# Patient Record
Sex: FEMALE | Race: WHITE | NOT HISPANIC OR LATINO | Employment: UNEMPLOYED | URBAN - METROPOLITAN AREA
[De-identification: names, ages, dates, MRNs, and addresses within clinical notes are randomized per-mention and may not be internally consistent; named-entity substitution may affect disease eponyms.]

---

## 2023-07-21 ENCOUNTER — HOSPITAL ENCOUNTER (EMERGENCY)
Facility: HOSPITAL | Age: 38
Discharge: HOME | End: 2023-07-22
Attending: EMERGENCY MEDICINE | Admitting: EMERGENCY MEDICINE
Payer: COMMERCIAL

## 2023-07-21 ENCOUNTER — APPOINTMENT (OUTPATIENT)
Dept: GENERAL RADIOLOGY | Facility: HOSPITAL | Age: 38
End: 2023-07-21
Payer: COMMERCIAL

## 2023-07-21 DIAGNOSIS — J20.9 ACUTE BRONCHITIS, UNSPECIFIED ORGANISM: Primary | ICD-10-CM

## 2023-07-21 LAB
BASOPHILS # BLD AUTO: 0.02 10*3/MM3 (ref 0–0.2)
BASOPHILS NFR BLD AUTO: 0.6 % (ref 0–1.5)
DEPRECATED RDW RBC AUTO: 46 FL (ref 37–54)
EOSINOPHIL # BLD AUTO: 0.03 10*3/MM3 (ref 0–0.4)
EOSINOPHIL NFR BLD AUTO: 0.9 % (ref 0.3–6.2)
ERYTHROCYTE [DISTWIDTH] IN BLOOD BY AUTOMATED COUNT: 15.2 % (ref 12.3–15.4)
HCT VFR BLD AUTO: 36.7 % (ref 34–46.6)
HGB BLD-MCNC: 12 G/DL (ref 12–15.9)
IMM GRANULOCYTES # BLD AUTO: 0.01 10*3/MM3 (ref 0–0.05)
IMM GRANULOCYTES NFR BLD AUTO: 0.3 % (ref 0–0.5)
LYMPHOCYTES # BLD AUTO: 0.87 10*3/MM3 (ref 0.7–3.1)
LYMPHOCYTES NFR BLD AUTO: 25.8 % (ref 19.6–45.3)
MCH RBC QN AUTO: 27.3 PG (ref 26.6–33)
MCHC RBC AUTO-ENTMCNC: 32.7 G/DL (ref 31.5–35.7)
MCV RBC AUTO: 83.4 FL (ref 79–97)
MONOCYTES # BLD AUTO: 0.48 10*3/MM3 (ref 0.1–0.9)
MONOCYTES NFR BLD AUTO: 14.2 % (ref 5–12)
NEUTROPHILS NFR BLD AUTO: 1.96 10*3/MM3 (ref 1.7–7)
NEUTROPHILS NFR BLD AUTO: 58.2 % (ref 42.7–76)
NRBC BLD AUTO-RTO: 0 /100 WBC (ref 0–0.2)
PLATELET # BLD AUTO: 196 10*3/MM3 (ref 140–450)
PMV BLD AUTO: 10.2 FL (ref 6–12)
RBC # BLD AUTO: 4.4 10*6/MM3 (ref 3.77–5.28)
WBC NRBC COR # BLD: 3.37 10*3/MM3 (ref 3.4–10.8)
WHOLE BLOOD HOLD SPECIMEN: NORMAL

## 2023-07-21 PROCEDURE — 80053 COMPREHEN METABOLIC PANEL: CPT | Performed by: EMERGENCY MEDICINE

## 2023-07-21 PROCEDURE — 87635 SARS-COV-2 COVID-19 AMP PRB: CPT | Performed by: EMERGENCY MEDICINE

## 2023-07-21 PROCEDURE — 71046 X-RAY EXAM CHEST 2 VIEWS: CPT

## 2023-07-21 PROCEDURE — 99283 EMERGENCY DEPT VISIT LOW MDM: CPT

## 2023-07-21 PROCEDURE — 85025 COMPLETE CBC W/AUTO DIFF WBC: CPT | Performed by: EMERGENCY MEDICINE

## 2023-07-22 VITALS
SYSTOLIC BLOOD PRESSURE: 122 MMHG | DIASTOLIC BLOOD PRESSURE: 71 MMHG | WEIGHT: 141.76 LBS | HEART RATE: 76 BPM | BODY MASS INDEX: 26.09 KG/M2 | OXYGEN SATURATION: 100 % | HEIGHT: 62 IN | TEMPERATURE: 98.8 F | RESPIRATION RATE: 16 BRPM

## 2023-07-22 LAB
ALBUMIN SERPL-MCNC: 4 G/DL (ref 3.5–5.2)
ALBUMIN/GLOB SERPL: 1.4 G/DL
ALP SERPL-CCNC: 75 U/L (ref 39–117)
ALT SERPL W P-5'-P-CCNC: 11 U/L (ref 1–33)
ANION GAP SERPL CALCULATED.3IONS-SCNC: 10.5 MMOL/L (ref 5–15)
AST SERPL-CCNC: 16 U/L (ref 1–32)
BILIRUB SERPL-MCNC: <0.2 MG/DL (ref 0–1.2)
BUN SERPL-MCNC: 15 MG/DL (ref 6–20)
BUN/CREAT SERPL: 18.8 (ref 7–25)
CALCIUM SPEC-SCNC: 8.7 MG/DL (ref 8.6–10.5)
CHLORIDE SERPL-SCNC: 103 MMOL/L (ref 98–107)
CO2 SERPL-SCNC: 23.5 MMOL/L (ref 22–29)
CREAT SERPL-MCNC: 0.8 MG/DL (ref 0.57–1)
EGFRCR SERPLBLD CKD-EPI 2021: 96.3 ML/MIN/1.73
GLOBULIN UR ELPH-MCNC: 2.9 GM/DL
GLUCOSE SERPL-MCNC: 97 MG/DL (ref 65–99)
HOLD SPECIMEN: NORMAL
HOLD SPECIMEN: NORMAL
POTASSIUM SERPL-SCNC: 3.7 MMOL/L (ref 3.5–5.2)
PROT SERPL-MCNC: 6.9 G/DL (ref 6–8.5)
SARS-COV-2 RNA RESP QL NAA+PROBE: NOT DETECTED
SODIUM SERPL-SCNC: 137 MMOL/L (ref 136–145)
WHOLE BLOOD HOLD COAG: NORMAL

## 2023-07-22 RX ORDER — IBUPROFEN 400 MG/1
800 TABLET ORAL ONCE
Status: COMPLETED | OUTPATIENT
Start: 2023-07-22 | End: 2023-07-22

## 2023-07-22 RX ORDER — PREDNISONE 20 MG/1
TABLET ORAL
Qty: 15 TABLET | Refills: 0 | Status: SHIPPED | OUTPATIENT
Start: 2023-07-22

## 2023-07-22 RX ORDER — AZITHROMYCIN 250 MG/1
TABLET, FILM COATED ORAL
Qty: 6 TABLET | Refills: 0 | Status: SHIPPED | OUTPATIENT
Start: 2023-07-22

## 2023-07-22 RX ADMIN — IBUPROFEN 800 MG: 400 TABLET, FILM COATED ORAL at 00:17

## 2023-07-22 NOTE — DISCHARGE INSTRUCTIONS
Drink plenty fluids.  Take medications as directed.  Take ibuprofen every 6 hours as needed for muscle aches or fever.  Take Tylenol every 4 hours as needed for muscle extra fever.  Return for worsening symptoms.  Follow with your doctor in 2 days if no better.  You will be called if your COVID test is positive.

## 2023-07-22 NOTE — ED PROVIDER NOTES
"Time: 10:48 PM EDT  Date of encounter:  7/21/2023  Independent Historian/Clinical History and Information was obtained by:   Patient    History is limited by: N/A    Chief Complaint: Cough and chest discomfort      History of Present Illness:  Patient is a 38 y.o. year old female who presents to the emergency department for evaluation of cough and chest discomfort.  The patient states that she has had some intermittent episodes of chills and muscle aches for which she took ibuprofen and temporarily felt better and then the symptoms seem to worsen during the night.  The patient has had a cough which is nonproductive she denies any vomiting or diarrhea or fever.    HPI    Patient Care Team  Primary Care Provider: Provider, No Known    Past Medical History:     No Known Allergies  No past medical history on file.  No past surgical history on file.  No family history on file.    Home Medications:  Prior to Admission medications    Not on File        Social History:          Review of Systems:  Review of Systems   Constitutional:  Positive for chills and fatigue. Negative for fever.   HENT:  Negative for congestion, ear pain and sore throat.    Eyes:  Negative for pain.   Respiratory:  Positive for cough. Negative for chest tightness and shortness of breath.    Cardiovascular:  Negative for chest pain.   Gastrointestinal:  Negative for abdominal pain, diarrhea, nausea and vomiting.   Genitourinary:  Negative for flank pain and hematuria.   Musculoskeletal:  Positive for myalgias. Negative for joint swelling.   Skin:  Negative for pallor.   Neurological:  Negative for seizures and headaches.   All other systems reviewed and are negative.     Physical Exam:  /71 (BP Location: Right arm, Patient Position: Lying)   Pulse 76   Temp 98.8 °F (37.1 °C) (Oral)   Resp 16   Ht 157.5 cm (62\")   Wt 64.3 kg (141 lb 12.1 oz)   LMP 07/18/2023 (Approximate)   SpO2 100%   BMI 25.93 kg/m²     Physical Exam  Vitals and nursing " note reviewed.   Constitutional:       General: She is not in acute distress.     Appearance: Normal appearance. She is not toxic-appearing.   HENT:      Head: Normocephalic and atraumatic.      Right Ear: External ear normal.      Left Ear: External ear normal.      Nose: Nose normal.      Mouth/Throat:      Mouth: Mucous membranes are moist.      Pharynx: Oropharynx is clear.   Eyes:      General: No scleral icterus.     Extraocular Movements: Extraocular movements intact.      Conjunctiva/sclera: Conjunctivae normal.      Pupils: Pupils are equal, round, and reactive to light.   Cardiovascular:      Rate and Rhythm: Normal rate and regular rhythm.      Pulses: Normal pulses.      Heart sounds: Normal heart sounds.   Pulmonary:      Effort: Pulmonary effort is normal. No respiratory distress.      Breath sounds: Normal breath sounds.   Abdominal:      General: Abdomen is flat.      Palpations: Abdomen is soft.      Tenderness: There is no abdominal tenderness.   Musculoskeletal:         General: Normal range of motion.      Cervical back: Normal range of motion and neck supple. No rigidity or tenderness.   Lymphadenopathy:      Cervical: No cervical adenopathy.   Skin:     General: Skin is warm and dry.   Neurological:      Mental Status: She is alert and oriented to person, place, and time. Mental status is at baseline.   Psychiatric:         Mood and Affect: Mood normal.         Behavior: Behavior normal.         Thought Content: Thought content normal.         Judgment: Judgment normal.              Procedures:  Procedures      Medical Decision Making:      Comorbidities that affect care:    None    External Notes reviewed:    Previous Clinic Note: Office visit with Regina Gonzalez for sinusitis      The following orders were placed and all results were independently analyzed by me:  Orders Placed This Encounter   Procedures    COVID-19,CEPHEID/SONYA,COR/JASS/PAD/ROEL/MAD IN-HOUSE(OR EMERGENT/ADD-ON),NP SWAB IN  TRANSPORT MEDIA 3-4 HR TAT, RT-PCR - Swab, Nasopharynx    XR Chest 2 View    [place] Draw    Comprehensive Metabolic Panel    CBC Auto Differential    CBC & Differential    Green Top (Gel)    Lavender Top    Gold Top - SST    Light Blue Top       Medications Given in the Emergency Department:  Medications   ibuprofen (ADVIL,MOTRIN) tablet 800 mg (800 mg Oral Given 7/22/23 0017)        ED Course:    ED Course as of 07/22/23 0230   Sat Jul 22, 2023   0009 BUN: 15 [PP]      ED Course User Index  [PP] Edwardo Whitney,        Labs:    Lab Results (last 24 hours)       Procedure Component Value Units Date/Time    CBC & Differential [[id number]]  (Abnormal) Collected: 07/21/23 2330    Specimen: Blood Updated: 07/21/23 2343    Narrative:      The following orders were created for panel order CBC & Differential.  Procedure                               Abnormality         Status                     ---------                               -----------         ------                     CBC Auto Differential[[id number]]        Abnormal            Final result                 Please view results for these tests on the individual orders.    Comprehensive Metabolic Panel [[id number]] Collected: 07/21/23 2330    Specimen: Blood Updated: 07/22/23 0006     Glucose 97 mg/dL      BUN 15 mg/dL      Creatinine 0.80 mg/dL      Sodium 137 mmol/L      Potassium 3.7 mmol/L      Chloride 103 mmol/L      CO2 23.5 mmol/L      Calcium 8.7 mg/dL      Total Protein 6.9 g/dL      Albumin 4.0 g/dL      ALT (SGPT) 11 U/L      AST (SGOT) 16 U/L      Alkaline Phosphatase 75 U/L      Total Bilirubin <0.2 mg/dL      Globulin 2.9 gm/dL      A/G Ratio 1.4 g/dL      BUN/Creatinine Ratio 18.8     Anion Gap 10.5 mmol/L      eGFR 96.3 mL/min/1.73     Narrative:      GFR Normal >60  Chronic Kidney Disease <60  Kidney Failure <15      CBC Auto Differential [[id number]]  (Abnormal) Collected: 07/21/23 2330    Specimen: Blood Updated: 07/21/23 2343     WBC 3.37  10*3/mm3      RBC 4.40 10*6/mm3      Hemoglobin 12.0 g/dL      Hematocrit 36.7 %      MCV 83.4 fL      MCH 27.3 pg      MCHC 32.7 g/dL      RDW 15.2 %      RDW-SD 46.0 fl      MPV 10.2 fL      Platelets 196 10*3/mm3      Neutrophil % 58.2 %      Lymphocyte % 25.8 %      Monocyte % 14.2 %      Eosinophil % 0.9 %      Basophil % 0.6 %      Immature Grans % 0.3 %      Neutrophils, Absolute 1.96 10*3/mm3      Lymphocytes, Absolute 0.87 10*3/mm3      Monocytes, Absolute 0.48 10*3/mm3      Eosinophils, Absolute 0.03 10*3/mm3      Basophils, Absolute 0.02 10*3/mm3      Immature Grans, Absolute 0.01 10*3/mm3      nRBC 0.0 /100 WBC     COVID-19,CEPHEID/SONYA,COR/JASS/PAD/ROEL/MAD IN-HOUSE(OR EMERGENT/ADD-ON),NP SWAB IN TRANSPORT MEDIA 3-4 HR TAT, RT-PCR - Swab, Nasopharynx [[id number]]  (Normal) Collected: 07/21/23 2330    Specimen: Swab from Nasopharynx Updated: 07/22/23 0035     COVID19 Not Detected    Narrative:      Fact sheet for providers: https://www.fda.gov/media/165994/download     Fact sheet for patients: https://www.fda.gov/media/200901/download  Fact sheet for providers: https://www.fda.gov/media/530293/download     Fact sheet for patients: https://www.fda.gov/media/482159/download             Imaging:    XR Chest 2 View    Result Date: 7/22/2023  PROCEDURE: XR CHEST 2 VW  COMPARISON: 7/14/2015.  INDICATIONS: COUGH; UNSPECIFIED WEAKNESS.  FINDINGS:  Two views of the chest reveal no cardiac enlargement and no acute infiltrate.  No pleural effusion or pneumothorax is seen.  Chronic calcified granulomatous disease involves the chest.  No significant interval change is seen since the prior study.        No acute infiltrate is appreciated.    Please note that portions of this note were completed with a voice recognition program.  SYD NARVAEZ JR, MD       Electronically Signed and Approved By: SYD NARVAEZ JR, MD on 7/22/2023 at 0:00                 Differential Diagnosis and Discussion:    Cough: Differential  diagnosis includes but is not limited to pneumonia, acute bronchitis, upper respiratory infection, ACE inhibitor use, allergic reaction, epiglottitis, seasonal allergies, chemical irritants, exercise-induced asthma, viral syndrome.    All labs were reviewed and interpreted by me.    MDM     Amount and/or Complexity of Data Reviewed  Clinical lab tests: reviewed  Tests in the radiology section of CPT®: reviewed             Patient Care Considerations:    CT CHEST: I considered ordering a CT scan of the chest, however the patient has no signs of pulmonary embolism      Consultants/Shared Management Plan:    None    Social Determinants of Health:    Patient is independent, reliable, and has access to care.       Disposition and Care Coordination:    Discharged: The patient is suitable and stable for discharge with no need for consideration of observation or admission.    I have explained discharge medications and the need for follow up with the patient/caretakers. This was also printed in the discharge instructions. Patient was discharged with the following medications and follow up:      Medication List        New Prescriptions      azithromycin 250 MG tablet  Commonly known as: Zithromax Z-Nav  Take 2 tablets by mouth on day 1, then 1 tablet daily on days 2-5     predniSONE 20 MG tablet  Commonly known as: DELTASONE  Take 3 p.o. daily for 5 days.               Where to Get Your Medications        These medications were sent to [facility]/pharmacy #[id number] - [place], [place] - [address] - [phone]  - [phone]  [address] [place] [address]      Hours: 24-hours Phone: [phone]   azithromycin 250 MG tablet  predniSONE 20 MG tablet      Your primary care provider    In 2 days         Final diagnoses:   Acute bronchitis, unspecified organism        ED Disposition       ED Disposition   Discharge    Condition   Stable    Comment   --               This medical record created using voice  recognition software.             Edwardo Whitney,   07/22/23 6522

## 2024-01-12 ENCOUNTER — HOSPITAL ENCOUNTER (EMERGENCY)
Facility: HOSPITAL | Age: 39
Discharge: HOME | End: 2024-01-12
Attending: EMERGENCY MEDICINE
Payer: COMMERCIAL

## 2024-01-12 ENCOUNTER — APPOINTMENT (OUTPATIENT)
Dept: ULTRASOUND IMAGING | Facility: HOSPITAL | Age: 39
End: 2024-01-12
Payer: COMMERCIAL

## 2024-01-12 VITALS
HEART RATE: 72 BPM | WEIGHT: 150 LBS | DIASTOLIC BLOOD PRESSURE: 64 MMHG | TEMPERATURE: 98.3 F | RESPIRATION RATE: 16 BRPM | SYSTOLIC BLOOD PRESSURE: 112 MMHG | BODY MASS INDEX: 28.32 KG/M2 | OXYGEN SATURATION: 100 % | HEIGHT: 61 IN

## 2024-01-12 DIAGNOSIS — N93.9 VAGINAL BLEEDING: Primary | ICD-10-CM

## 2024-01-12 DIAGNOSIS — D25.9 UTERINE LEIOMYOMA, UNSPECIFIED LOCATION: ICD-10-CM

## 2024-01-12 LAB
ALBUMIN SERPL-MCNC: 4.1 G/DL (ref 3.5–5.2)
ALBUMIN/GLOB SERPL: 1.4 G/DL
ALP SERPL-CCNC: 69 U/L (ref 39–117)
ALT SERPL W P-5'-P-CCNC: 8 U/L (ref 1–33)
ANION GAP SERPL CALCULATED.3IONS-SCNC: 9.2 MMOL/L (ref 5–15)
AST SERPL-CCNC: 14 U/L (ref 1–32)
BASOPHILS # BLD AUTO: 0.05 10*3/MM3 (ref 0–0.2)
BASOPHILS NFR BLD AUTO: 0.6 % (ref 0–1.5)
BILIRUB SERPL-MCNC: 0.3 MG/DL (ref 0–1.2)
BUN SERPL-MCNC: 17 MG/DL (ref 6–20)
BUN/CREAT SERPL: 18.3 (ref 7–25)
CALCIUM SPEC-SCNC: 9 MG/DL (ref 8.6–10.5)
CHLORIDE SERPL-SCNC: 105 MMOL/L (ref 98–107)
CO2 SERPL-SCNC: 23.8 MMOL/L (ref 22–29)
CREAT SERPL-MCNC: 0.93 MG/DL (ref 0.57–1)
DEPRECATED RDW RBC AUTO: 45.1 FL (ref 37–54)
EGFRCR SERPLBLD CKD-EPI 2021: 80.3 ML/MIN/1.73
EOSINOPHIL # BLD AUTO: 0.04 10*3/MM3 (ref 0–0.4)
EOSINOPHIL NFR BLD AUTO: 0.5 % (ref 0.3–6.2)
ERYTHROCYTE [DISTWIDTH] IN BLOOD BY AUTOMATED COUNT: 14.9 % (ref 12.3–15.4)
GLOBULIN UR ELPH-MCNC: 2.9 GM/DL
GLUCOSE SERPL-MCNC: 108 MG/DL (ref 65–99)
HCG INTACT+B SERPL-ACNC: <0.5 MIU/ML
HCT VFR BLD AUTO: 37 % (ref 34–46.6)
HGB BLD-MCNC: 12 G/DL (ref 12–15.9)
IMM GRANULOCYTES # BLD AUTO: 0.02 10*3/MM3 (ref 0–0.05)
IMM GRANULOCYTES NFR BLD AUTO: 0.2 % (ref 0–0.5)
LYMPHOCYTES # BLD AUTO: 1.62 10*3/MM3 (ref 0.7–3.1)
LYMPHOCYTES NFR BLD AUTO: 19.4 % (ref 19.6–45.3)
MCH RBC QN AUTO: 27 PG (ref 26.6–33)
MCHC RBC AUTO-ENTMCNC: 32.4 G/DL (ref 31.5–35.7)
MCV RBC AUTO: 83.3 FL (ref 79–97)
MONOCYTES # BLD AUTO: 0.51 10*3/MM3 (ref 0.1–0.9)
MONOCYTES NFR BLD AUTO: 6.1 % (ref 5–12)
NEUTROPHILS NFR BLD AUTO: 6.1 10*3/MM3 (ref 1.7–7)
NEUTROPHILS NFR BLD AUTO: 73.2 % (ref 42.7–76)
NRBC BLD AUTO-RTO: 0 /100 WBC (ref 0–0.2)
PLATELET # BLD AUTO: 230 10*3/MM3 (ref 140–450)
PMV BLD AUTO: 9.6 FL (ref 6–12)
POTASSIUM SERPL-SCNC: 4.3 MMOL/L (ref 3.5–5.2)
PROT SERPL-MCNC: 7 G/DL (ref 6–8.5)
RBC # BLD AUTO: 4.44 10*6/MM3 (ref 3.77–5.28)
SODIUM SERPL-SCNC: 138 MMOL/L (ref 136–145)
WBC NRBC COR # BLD AUTO: 8.34 10*3/MM3 (ref 3.4–10.8)
WHOLE BLOOD HOLD COAG: NORMAL
WHOLE BLOOD HOLD SPECIMEN: NORMAL

## 2024-01-12 PROCEDURE — 99284 EMERGENCY DEPT VISIT MOD MDM: CPT

## 2024-01-12 PROCEDURE — 76830 TRANSVAGINAL US NON-OB: CPT

## 2024-01-12 PROCEDURE — 85025 COMPLETE CBC W/AUTO DIFF WBC: CPT | Performed by: EMERGENCY MEDICINE

## 2024-01-12 PROCEDURE — 80053 COMPREHEN METABOLIC PANEL: CPT

## 2024-01-12 PROCEDURE — 84702 CHORIONIC GONADOTROPIN TEST: CPT

## 2024-01-12 PROCEDURE — 36415 COLL VENOUS BLD VENIPUNCTURE: CPT

## 2024-01-12 RX ORDER — SODIUM CHLORIDE 0.9 % (FLUSH) 0.9 %
10 SYRINGE (ML) INJECTION AS NEEDED
Status: DISCONTINUED | OUTPATIENT
Start: 2024-01-12 | End: 2024-01-12 | Stop reason: HOSPADM

## 2024-01-12 NOTE — ED PROVIDER NOTES
Time: 6:40 PM EST  Date of encounter:  1/12/2024  Independent Historian/Clinical History and Information was obtained by:   Patient    History is limited by: N/A    Chief Complaint: Vaginal bleeding      History of Present Illness:  Patient is a 38 y.o. year old female who presents to the emergency department for evaluation of vaginal bleeding that began today.  Patient states she is on her menstrual cycle but today her bleeding has been more than normal.  Patient states the blood has been bright red.  Patient denies clots.  Patient denies abdominal pain.  Patient denies weakness, chest pain, shortness of air.  Patient denies fever, dysuria, flank pain.  Patient denies vaginal trauma.    HPI    Patient Care Team  Primary Care Provider: Christian Hernandez MD    Past Medical History:     No Known Allergies  No past medical history on file.  No past surgical history on file.  No family history on file.    Home Medications:  Prior to Admission medications    Medication Sig Start Date End Date Taking? Authorizing Provider   azithromycin (Zithromax Z-Nav) 250 MG tablet Take 2 tablets by mouth on day 1, then 1 tablet daily on days 2-5 7/22/23   Edwardo Whitney DO   predniSONE (DELTASONE) 20 MG tablet Take 3 p.o. daily for 5 days. 7/22/23   Edwardo Whitney DO        Social History:          Review of Systems:  Review of Systems   Constitutional:  Negative for chills and fever.   HENT:  Negative for congestion, rhinorrhea and sore throat.    Eyes:  Negative for pain and visual disturbance.   Respiratory:  Negative for apnea, cough, chest tightness and shortness of breath.    Cardiovascular:  Negative for chest pain and palpitations.   Gastrointestinal:  Negative for abdominal pain, diarrhea, nausea and vomiting.   Genitourinary:  Positive for vaginal bleeding. Negative for difficulty urinating and dysuria.   Musculoskeletal:  Negative for joint swelling and myalgias.   Skin:  Negative for color change.   Neurological:   "Negative for seizures and headaches.   Psychiatric/Behavioral: Negative.     All other systems reviewed and are negative.       Physical Exam:  /68 (BP Location: Left arm, Patient Position: Sitting)   Pulse 64   Temp 98.3 °F (36.8 °C) (Oral)   Resp 16   Ht 154.9 cm (61\")   Wt 68 kg (150 lb)   LMP 01/12/2024   SpO2 100%   BMI 28.34 kg/m²     Physical Exam  Vitals and nursing note reviewed.   Constitutional:       General: She is not in acute distress.     Appearance: Normal appearance. She is not toxic-appearing.   HENT:      Head: Normocephalic and atraumatic.      Jaw: There is normal jaw occlusion.   Eyes:      General: Lids are normal.      Extraocular Movements: Extraocular movements intact.      Conjunctiva/sclera: Conjunctivae normal.      Pupils: Pupils are equal, round, and reactive to light.   Cardiovascular:      Rate and Rhythm: Normal rate and regular rhythm.      Pulses: Normal pulses.      Heart sounds: Normal heart sounds.   Pulmonary:      Effort: Pulmonary effort is normal. No respiratory distress.      Breath sounds: Normal breath sounds. No wheezing or rhonchi.   Abdominal:      General: Abdomen is flat.      Palpations: Abdomen is soft.      Tenderness: There is no abdominal tenderness. There is no guarding or rebound.   Musculoskeletal:         General: Normal range of motion.      Cervical back: Normal range of motion and neck supple.      Right lower leg: No edema.      Left lower leg: No edema.   Skin:     General: Skin is warm and dry.   Neurological:      Mental Status: She is alert and oriented to person, place, and time. Mental status is at baseline.   Psychiatric:         Mood and Affect: Mood normal.                  Procedures:  Procedures      Medical Decision Making:      Comorbidities that affect care:    None    External Notes reviewed:          The following orders were placed and all results were independently analyzed by me:  Orders Placed This Encounter "   Procedures    US Non-ob Transvaginal    [place] Draw    CBC Auto Differential    hCG, Quantitative, Pregnancy    Comprehensive Metabolic Panel    Ambulatory Referral to Obstetrics / Gynecology    NPO Diet NPO Type: Strict NPO    Undress & Gown    Vital Signs    Orthostatic Blood Pressure    Supplies To Bedside - Notify MD When Ready- Pelvic cart / set up    Pulse Oximetry    Oxygen Therapy- Nasal Cannula; Titrate 1-6 LPM Per SpO2; 90 - 95%    Type & Screen    Insert Peripheral IV    CBC & Differential    Green Top (Gel)    Lavender Top    Gold Top - SST    Light Blue Top       Medications Given in the Emergency Department:  Medications   sodium chloride 0.9 % flush 10 mL (has no administration in time range)        ED Course:         Labs:    Lab Results (last 24 hours)       Procedure Component Value Units Date/Time    CBC & Differential [[id number]]  (Abnormal) Collected: 01/12/24 1557    Specimen: Blood Updated: 01/12/24 1607    Narrative:      The following orders were created for panel order CBC & Differential.  Procedure                               Abnormality         Status                     ---------                               -----------         ------                     CBC Auto Differential[[id number]]        Abnormal            Final result                 Please view results for these tests on the individual orders.    CBC Auto Differential [[id number]]  (Abnormal) Collected: 01/12/24 1557    Specimen: Blood Updated: 01/12/24 1607     WBC 8.34 10*3/mm3      RBC 4.44 10*6/mm3      Hemoglobin 12.0 g/dL      Hematocrit 37.0 %      MCV 83.3 fL      MCH 27.0 pg      MCHC 32.4 g/dL      RDW 14.9 %      RDW-SD 45.1 fl      MPV 9.6 fL      Platelets 230 10*3/mm3      Neutrophil % 73.2 %      Lymphocyte % 19.4 %      Monocyte % 6.1 %      Eosinophil % 0.5 %      Basophil % 0.6 %      Immature Grans % 0.2 %      Neutrophils, Absolute 6.10 10*3/mm3      Lymphocytes, Absolute 1.62 10*3/mm3      Monocytes,  Absolute 0.51 10*3/mm3      Eosinophils, Absolute 0.04 10*3/mm3      Basophils, Absolute 0.05 10*3/mm3      Immature Grans, Absolute 0.02 10*3/mm3      nRBC 0.0 /100 WBC     hCG, Quantitative, Pregnancy [[id number]] Collected: 01/12/24 1557    Specimen: Blood Updated: 01/12/24 1648     HCG Quantitative <0.50 mIU/mL     Narrative:      HCG Ranges by Gestational Age    Females - non-pregnant premenopausal   </= 1mIU/mL HCG  Females - postmenopausal               </= 7mIU/mL HCG    3 Weeks       5.4   -      72 mIU/mL  4 Weeks      10.2   -     708 mIU/mL  5 Weeks       217   -   8,245 mIU/mL  6 Weeks       152   -  32,177 mIU/mL  7 Weeks     4,059   - 153,767 mIU/mL  8 Weeks    31,366   - 149,094 mIU/mL  9 Weeks    59,109   - 135,901 mIU/mL  10 Weeks   44,186   - 170,409 mIU/mL  12 Weeks   27,107   - 201,615 mIU/mL  14 Weeks   24,302   -  93,646 mIU/mL  15 Weeks   12,540   -  69,747 mIU/mL  16 Weeks    8,904   -  55,332 mIU/mL  17 Weeks    8,240   -  51,793 mIU/mL  18 Weeks    9,649   -  55,271 mIU/mL      Comprehensive Metabolic Panel [[id number]]  (Abnormal) Collected: 01/12/24 1557    Specimen: Blood Updated: 01/12/24 1625     Glucose 108 mg/dL      BUN 17 mg/dL      Creatinine 0.93 mg/dL      Sodium 138 mmol/L      Potassium 4.3 mmol/L      Chloride 105 mmol/L      CO2 23.8 mmol/L      Calcium 9.0 mg/dL      Total Protein 7.0 g/dL      Albumin 4.1 g/dL      ALT (SGPT) 8 U/L      AST (SGOT) 14 U/L      Alkaline Phosphatase 69 U/L      Total Bilirubin 0.3 mg/dL      Globulin 2.9 gm/dL      A/G Ratio 1.4 g/dL      BUN/Creatinine Ratio 18.3     Anion Gap 9.2 mmol/L      eGFR 80.3 mL/min/1.73     Narrative:      GFR Normal >60  Chronic Kidney Disease <60  Kidney Failure <15               Imaging:    US Non-ob Transvaginal    Result Date: 1/12/2024  PROCEDURE: US NON-OB TRANSVAGINAL  COMPARISON: None  INDICATIONS: heavy vaginal bleeding  TECHNIQUE: Ultrasound examination of the pelvis was performed, using endovaginal  technique.   FINDINGS:  UTERUS: Size is 8.4 x 4.3 x 5.4 cm with unremarkable appearance.  There is a round heterogeneous part shadowing lesion within the uterine fundus closely approximating endometrial stripe measuring 1.9 x 2.1 x 1.7 cm.  Myometrium otherwise homogeneous without additional lesion.  Endometrial stripe more inferiorly does not appear thickened measuring up to 4 mm within normal limits for patient age. ADNEXAE: Normal bilateral appearance with no significant masses.  Each ovary is normal in size for a patient of this age.  Symmetric color and spectral flow. CUL-DE-SAC: Normal.  No fluid or mass.  OTHER: Negative.         1.  Solid lesion in the uterine fundus closely approximating endometrial stripe measuring up to 2.1 cm.  This likely represents a uterine fibroid with submucosal contact in the setting of heavy bleeding.  Consider gynecology consultation.  A contrasted MRI of the pelvis could be considered for more accurate assessment and prior to any procedural /surgical intervention. 2. Normal appearing ovaries.     WAYNE MCMAHAN MD       Electronically Signed and Approved By: WAYNE MCMAHAN MD on 1/12/2024 at 17:50                Differential Diagnosis and Discussion:    Vaginal Bleeding: Differential diagnosis includes but is not limited to foreign body, tumor, vaginitis, dysfunctional uterine bleeding, endocrine abnormalities, coagulation disorder, systemic illness, polyps, complications of pregnancy (possible ectopic pregnancy).    All labs were reviewed and interpreted by me.  Ultrasound impression was interpreted by me.     MDM     The patient´s CBC that was reviewed and interpreted by me shows no abnormalities of critical concern. Of note, there is no anemia requiring a blood transfusion and the platelet count is acceptable.  The patient´s CMP that was reviewed and interpretted by me shows no abnormalities of critical concern. Of note, the patient´s sodium and potassium are acceptable.  The patient´s liver enzymes are unremarkable. The patient´s renal function (creatinine) is preserved. The patient has a normal anion gap.  hCG negative.  Ultrasound non-OB transvaginal shows Solid lesion in the uterine fundus closely approximating endometrial stripe measuring up to 2.1 cm.  This likely represents a uterine fibroid with submucosal contact in the setting of heavy bleeding.  Patient reports she is not actively bleeding at this time.  Patient states she has no complaints.  Given the patient's ultrasound reading I will help provide an ambulatory referral to OB/GYN for follow-up.  I instructed patient to return to the ED if she develops any worsening symptoms.  Patient states she understands and agrees with plan of care.          Patient Care Considerations:          Consultants/Shared Management Plan:    None    Social Determinants of Health:    Patient is independent, reliable, and has access to care.       Disposition and Care Coordination:    Discharged: I considered escalation of care by admitting this patient to the hospital, however the patient has improved and is suitable and stable for discharge.    I have explained the patient´s condition, diagnoses and treatment plan based on the information available to me at this time. I have answered questions and addressed any concerns. The patient has a good  understanding of the patient´s diagnosis, condition, and treatment plan as can be expected at this point. The vital signs have been stable. The patient´s condition is stable and appropriate for discharge from the emergency department.      The patient will pursue further outpatient evaluation with the primary care physician or other designated or consulting physician as outlined in the discharge instructions. They are agreeable to this plan of care and follow-up instructions have been explained in detail. The patient has received these instructions in written format and have expressed an understanding  of the discharge instructions. The patient is aware that any significant change in condition or worsening of symptoms should prompt an immediate return to this or the closest emergency department or call to 1.  I have explained discharge medications and the need for follow up with the patient/caretakers. This was also printed in the discharge instructions. Patient was discharged with the following medications and follow up:      Medication List      No changes were made to your prescriptions during this visit.      Naif Arnold MD  [address] [place], [place] [id number]  [phone]    Schedule an appointment as soon as possible for a visit in 3 days  Further evaluation/follow-up       Final diagnoses:   Vaginal bleeding   Uterine leiomyoma, unspecified location        ED Disposition       ED Disposition   Discharge    Condition   Stable    Comment   --               This medical record created using voice recognition software.             Gordon Gonsalez PA-C  01/12/24 2624

## 2024-01-12 NOTE — ED NOTES
Pt discharged home, all questions answered. She ambulated out of ED by herself all questions answered.

## 2024-01-31 ENCOUNTER — TELEPHONE (OUTPATIENT)
Dept: OBSTETRICS AND GYNECOLOGY | Facility: CLINIC | Age: 39
End: 2024-01-31
Payer: COMMERCIAL

## 2024-01-31 NOTE — TELEPHONE ENCOUNTER
Provider: NONE YET    Caller: BO SKINNER    Relationship to Patient: ER    Pharmacy:     Phone Number: [phone] (PATIENT) VERIFIED    Reason for Call: LUIZ/BRODY ADV WOULD LIKE OFFICE TO SCHEDULE NEW GYN ABNORMAL BLEEDING DURING MENSTRUAL CYCLE - U/S POSS LESOIN/FIBROID IN CHART    When was the patient last seen:

## 2024-02-07 PROBLEM — D25.0 SUBMUCOUS LEIOMYOMA OF UTERUS: Status: ACTIVE | Noted: 2024-02-07

## 2024-02-07 PROBLEM — N93.9 ABNORMAL UTERINE BLEEDING (AUB): Status: ACTIVE | Noted: 2024-02-07

## 2024-02-07 NOTE — PROGRESS NOTES
"GYN new patient    CC: AUB    Tobacco/Nicotine use:  No    HPI:   38 y.o. Contraception or HRT: Contraception:  Tubal ligation  Menses:   q 22 days, lasts 4 days, changes products q 3 hrs on heaviest days.   Pain:  None    Patient is complaining of increasingly heavy Cycles for the past several months.  US Non-ob Transvaginal (2024 17:25)    History: PMHx, Meds, Allergies, PSHx, Social Hx, and POBHx all reviewed and updated.  PCP:Christian Hernandez MD      Review of Systems     /70   Pulse 73   Ht 154.9 cm (61\")   Wt 69.9 kg (154 lb)   LMP 2024   Breastfeeding No   BMI 29.10 kg/m²     Physical Exam  Vitals and nursing note reviewed. Exam conducted with a chaperone present.   Constitutional:       Appearance: Normal appearance.   Cardiovascular:      Rate and Rhythm: Normal rate and regular rhythm.      Heart sounds: Normal heart sounds.   Pulmonary:      Effort: Pulmonary effort is normal.      Breath sounds: Normal breath sounds.   Abdominal:      General: Abdomen is flat. Bowel sounds are normal.      Palpations: Abdomen is soft.   Genitourinary:     General: Normal vulva.      Exam position: Lithotomy position.      Labia:         Right: No lesion.         Left: No lesion.       Urethra: No prolapse or urethral lesion.      Vagina: Normal. Bleeding present.      Cervix: Normal.      Uterus: Normal.       Adnexa: Right adnexa normal and left adnexa normal.   Neurological:      Mental Status: She is alert.     I reviewed the patient's emergency department visit notes and I also reviewed the patient's pelvic ultrasound    ASSESSMENT AND PLAN:  Problem Visit    Diagnoses and all orders for this visit:    1. Abnormal uterine bleeding (AUB) (Primary)  Assessment & Plan:  Pt states she has been having increasingly heavy cycles  States periods used to cause her to feel sick and lightheaded as a teenager and started OCPs.  Conceived on OCPs.  Lost 50 pounds several years ago and has regained about " 30 pounds  States menses will last 4 days of regular flow and then will have some BTB about a week later for 4-5 days.  Has had episodes of bleeding so heavy, she bleeds through protection and blood runs down her legs  Sometimes feels weak and lightheaded on menses.  Passes large clots as well  Patient has never tried medical management for the symptoms.  She also states she will occasionally have some hot flashes and night sweats that are sporadic.  We discussed the possibility of perimenopause.  Patient was counseled regarding the risks benefits of OCPs.  She is a non-smoker with normal blood pressure no history of VTE.  Patient will start OCPs with this cycle and follow-up in 3 months for reevaluation as well as an endometrial biopsy    Orders:  -     norgestrel-ethinyl estradiol (LOW-OGESTREL,CRYSELLE) 0.3-30 MG-MCG per tablet; Take 1 tablet by mouth Daily.  Dispense: 84 tablet; Refill: 3  -     IgP, Aptima HPV    2. Submucous leiomyoma of uterus  Assessment & Plan:  Ultrasound while patient was in the ER showed a 2 cm fundal fibroid that appears to be submucosal.    Orders:  -     norgestrel-ethinyl estradiol (LOW-OGESTREL,CRYSELLE) 0.3-30 MG-MCG per tablet; Take 1 tablet by mouth Daily.  Dispense: 84 tablet; Refill: 3    3. Pap smear for cervical cancer screening  -     IgP, Aptima HPV        Counseling:     OCP/Hormone use risk SUMMER  Track menses, RTO IF <q21d, >7d long, or heavy              Follow Up:  Return in about 3 months (around 5/8/2024) for EMB.        Yanelis Ceja MD  02/08/2024

## 2024-02-08 ENCOUNTER — OFFICE VISIT (OUTPATIENT)
Dept: OBSTETRICS AND GYNECOLOGY | Facility: CLINIC | Age: 39
End: 2024-02-08
Payer: COMMERCIAL

## 2024-02-08 VITALS
BODY MASS INDEX: 29.07 KG/M2 | DIASTOLIC BLOOD PRESSURE: 70 MMHG | HEART RATE: 73 BPM | HEIGHT: 61 IN | WEIGHT: 154 LBS | SYSTOLIC BLOOD PRESSURE: 108 MMHG

## 2024-02-08 DIAGNOSIS — N93.9 ABNORMAL UTERINE BLEEDING (AUB): Primary | ICD-10-CM

## 2024-02-08 DIAGNOSIS — Z12.4 PAP SMEAR FOR CERVICAL CANCER SCREENING: ICD-10-CM

## 2024-02-08 DIAGNOSIS — D25.0 SUBMUCOUS LEIOMYOMA OF UTERUS: ICD-10-CM

## 2024-02-08 PROCEDURE — 87624 HPV HI-RISK TYP POOLED RSLT: CPT

## 2024-02-08 PROCEDURE — G0123 SCREEN CERV/VAG THIN LAYER: HCPCS

## 2024-02-08 NOTE — ASSESSMENT & PLAN NOTE
Ultrasound while patient was in the ER showed a 2 cm fundal fibroid that appears to be submucosal.

## 2024-02-08 NOTE — ASSESSMENT & PLAN NOTE
Pt states she has been having increasingly heavy cycles  States periods used to cause her to feel sick and lightheaded as a teenager and started OCPs.  Conceived on OCPs.  Lost 50 pounds several years ago and has regained about 30 pounds  States menses will last 4 days of regular flow and then will have some BTB about a week later for 4-5 days.  Has had episodes of bleeding so heavy, she bleeds through protection and blood runs down her legs  Sometimes feels weak and lightheaded on menses.  Passes large clots as well  Patient has never tried medical management for the symptoms.  She also states she will occasionally have some hot flashes and night sweats that are sporadic.  We discussed the possibility of perimenopause.  Patient was counseled regarding the risks benefits of OCPs.  She is a non-smoker with normal blood pressure no history of VTE.  Patient will start OCPs with this cycle and follow-up in 3 months for reevaluation as well as an endometrial biopsy

## 2024-02-09 ENCOUNTER — PATIENT ROUNDING (BHMG ONLY) (OUTPATIENT)
Dept: OBSTETRICS AND GYNECOLOGY | Facility: CLINIC | Age: 39
End: 2024-02-09
Payer: COMMERCIAL

## 2024-02-09 NOTE — PROGRESS NOTES
A My-Chart message has been sent to the patient for PATIENT ROUNDING with [facility].

## 2024-02-13 LAB
CYTOLOGIST CVX/VAG CYTO: ABNORMAL
CYTOLOGY CVX/VAG DOC CYTO: ABNORMAL
CYTOLOGY CVX/VAG DOC THIN PREP: ABNORMAL
DX ICD CODE: ABNORMAL
DX ICD CODE: ABNORMAL
HIV 1 & 2 AB SER-IMP: ABNORMAL
HPV I/H RISK 4 DNA CVX QL PROBE+SIG AMP: NEGATIVE
OTHER STN SPEC: ABNORMAL
PATHOLOGIST CVX/VAG CYTO: ABNORMAL
STAT OF ADQ CVX/VAG CYTO-IMP: ABNORMAL

## 2024-05-16 NOTE — PROGRESS NOTES
"    CC: Presents for Endometrial biopsy  Procedure was explained in detail.  She understands the potential risks include, but are not limited to, failure (pregnancy), pain, bleeding, uterine perforation, and infection.  Her questions have been answered.      Subjective:  AUB.  Is having side effects on OCPs and would like to discuss other management    Objective:  /67   Pulse 83   Ht 154.9 cm (61\")   Wt 64.4 kg (142 lb)   LMP 05/05/2024 (Approximate)   BMI 26.83 kg/m²   General- NAD, alert and oriented, appropriate  Psych- Normal mood, good memory  Abdomen- Soft, non distended, non tender, no masses  External genitalia- Normal, no lesions  Vagina- Normal, no discharge  Uterus- Normal size, shape & consistency.  Non tender, mobile.  Cvx- Normal without lesion or discharge.     Betadine x3.  Tenaculum placed Yes.   Uterus sounded to 8.5cm.    Patient tolerated well.      Assessment and Plan:  Diagnoses and all orders for this visit:    1. Abnormal uterine bleeding (AUB) (Primary)  Assessment & Plan:  Has been on OCPs for 3 months  First cycle was short and light.  The last 2 cycles she had very heavy bleeding.  Changes products q 1 hours and having accidents  States she has also developed severe leg cramps since starting OCPs as well as acne on her chin.    Orders:  -     Tissue Pathology Exam  -     POC Pregnancy, Urine          Counseling:  She was counseled on EMB.  PRECAUTIONS-- If she has any fevers >101.5F, and abdominal/pelvic pain, or bleeding > 1pad/2hours, she needs to call our office or on call/ER (after hours/weekend).  Follow Up:  Return for schedule Neida.      Yanelis Ceja MD  05/22/2024  "

## 2024-05-22 ENCOUNTER — PROCEDURE VISIT (OUTPATIENT)
Dept: OBSTETRICS AND GYNECOLOGY | Facility: CLINIC | Age: 39
End: 2024-05-22
Payer: COMMERCIAL

## 2024-05-22 VITALS
HEIGHT: 61 IN | HEART RATE: 83 BPM | DIASTOLIC BLOOD PRESSURE: 67 MMHG | SYSTOLIC BLOOD PRESSURE: 100 MMHG | BODY MASS INDEX: 26.81 KG/M2 | WEIGHT: 142 LBS

## 2024-05-22 DIAGNOSIS — N93.9 ABNORMAL UTERINE BLEEDING (AUB): Primary | ICD-10-CM

## 2024-05-22 LAB
B-HCG UR QL: NEGATIVE
EXPIRATION DATE: NORMAL
INTERNAL NEGATIVE CONTROL: NORMAL
INTERNAL POSITIVE CONTROL: NORMAL
Lab: NORMAL

## 2024-05-22 PROCEDURE — 88305 TISSUE EXAM BY PATHOLOGIST: CPT | Performed by: OBSTETRICS & GYNECOLOGY

## 2024-05-22 PROCEDURE — 81025 URINE PREGNANCY TEST: CPT | Performed by: OBSTETRICS & GYNECOLOGY

## 2024-05-22 PROCEDURE — 58100 BIOPSY OF UTERUS LINING: CPT | Performed by: OBSTETRICS & GYNECOLOGY

## 2024-05-22 NOTE — ASSESSMENT & PLAN NOTE
Has been on OCPs for 3 months  First cycle was short and light.  The last 2 cycles she had very heavy bleeding.  Changes products q 1 hours and having accidents  States she has also developed severe leg cramps since starting OCPs as well as acne on her chin.

## 2024-05-24 LAB
CYTO UR: NORMAL
LAB AP CASE REPORT: NORMAL
LAB AP CLINICAL INFORMATION: NORMAL
PATH REPORT.FINAL DX SPEC: NORMAL
PATH REPORT.GROSS SPEC: NORMAL

## 2024-06-06 ENCOUNTER — TELEPHONE (OUTPATIENT)
Dept: OBSTETRICS AND GYNECOLOGY | Facility: CLINIC | Age: 39
End: 2024-06-06
Payer: COMMERCIAL

## 2024-06-06 NOTE — TELEPHONE ENCOUNTER
Left a message trying to let the patient she needs to schedule an appointment to discuss. HUB TO RELAY

## 2024-06-06 NOTE — TELEPHONE ENCOUNTER
Provider: DR SIMMS    Caller: RENATA SPENCER    Relationship to Patient: SELF      Phone Number: [phone]    Reason for Call: PT STATED SHE HAS BEEN THINKING ABOUT GETTING THE MIRENA INSERTED BUT NOW SHE WANTS TO DO THE ABLATION INSTEAD; PT WOULD LIKE TO SPEAK TO DR SIMMS ABOUT THIS    PLEASE CALL PT TO ADVISE    PT IS SCHEDULED FOR MIRENA AND LAB FOR THE MIRENA

## 2024-07-23 NOTE — PROGRESS NOTES
"GYN Visit    CC: AUB    HPI:   39 y.o. Contraception or HRT: Contraception:  Tubal ligation  Pt continues to have AUB since stopping OCPs.  She desires definitive therapy.  Most recent Hgb was 10.2 and she is currently on iron  Patient has failed medical management and desires definitive surgical therapy      History: PMHx, Meds, Allergies, PSHx, Social Hx, and POBHx all reviewed and updated.  Physical Exam   PHYSICAL EXAM:  /72   Pulse 76   Ht 154.9 cm (60.98\")   Wt 64.9 kg (143 lb)   LMP 2024 (Exact Date)   Breastfeeding No   BMI 27.03 kg/m²   General- NAD, alert and oriented, appropriate  Psych- Normal mood, good memory    Neck- No masses, no thyroid enlargement  CV- Regular rhythm, no murnurs  Resp- CTA to bases, no wheezes  Abdomen- Soft, non distended, non tender, no masses      ASSESSMENT AND PLAN:  Diagnoses and all orders for this visit:    1. Abnormal uterine bleeding (AUB) (Primary)  Assessment & Plan:  Pt stopped the OCPs as they weren't helping and she was having side effects  She is interested in surgical management    Orders:  -     Case Request; Standing  -     Cancel: sodium chloride 0.9 % flush 3 mL  -     Cancel: sodium chloride 0.9 % flush 10 mL  -     Cancel: sodium chloride 0.9 % infusion 40 mL  -     Cancel: lactated ringers infusion  -     Cancel: acetaminophen (TYLENOL) tablet 1,000 mg  -     Cancel: ceFAZolin (ANCEF) 2,000 mg in sodium chloride 0.9 % 100 mL IVPB  -     Case Request    Other orders  -     Cancel: Code Status and Medical Interventions: CPR (Attempt to Resuscitate); Full Support; Standing  -     Cancel: Follow Anesthesia Guidelines / Protocol; Future  -     Cancel: Follow Anesthesia Guidelines / Protocol; Standing  -     Cancel: Obtain Informed Consent; Standing  -     Cancel: Verify / Perform Chlorhexidine Skin Prep; Standing  -     Cancel: CBC & Differential; Standing  -     Cancel: Urinalysis With Culture If Indicated -; Standing  -     Cancel: " Pregnancy, Urine - Urine, Clean Catch; Standing  -     Cancel: Insert Peripheral IV; Standing  -     Cancel: Saline Lock & Maintain IV Access; Standing  -     Cancel: Place Sequential Compression Device; Standing  -     Cancel: Maintain Sequential Compression Device; Standing        Counseling: Pt was counseled at length regarding the risks and benefits of surgery.  The risks include but are not limited to the risk of anesthesia, the risk of bleeding, the risk of infection, the risk of injury to the bowel, bladder, ureters and any surrounding structures.  Risks also include possibility of needing future surgery to correct an issue as a result of the first surgery.  All questions were answered and informed consent was obtained.         Follow Up:  Return for 2 weeks preop.          Yanelis Ceja MD  07/24/2024

## 2024-07-24 ENCOUNTER — OFFICE VISIT (OUTPATIENT)
Dept: OBSTETRICS AND GYNECOLOGY | Facility: CLINIC | Age: 39
End: 2024-07-24
Payer: COMMERCIAL

## 2024-07-24 VITALS
WEIGHT: 143 LBS | HEART RATE: 76 BPM | BODY MASS INDEX: 27 KG/M2 | DIASTOLIC BLOOD PRESSURE: 72 MMHG | HEIGHT: 61 IN | SYSTOLIC BLOOD PRESSURE: 108 MMHG

## 2024-07-24 DIAGNOSIS — N93.9 ABNORMAL UTERINE BLEEDING (AUB): Primary | ICD-10-CM

## 2024-07-24 RX ORDER — SODIUM CHLORIDE 0.9 % (FLUSH) 0.9 %
3 SYRINGE (ML) INJECTION EVERY 12 HOURS SCHEDULED
Status: CANCELLED | OUTPATIENT
Start: 2024-07-24

## 2024-07-24 RX ORDER — SODIUM CHLORIDE, SODIUM LACTATE, POTASSIUM CHLORIDE, CALCIUM CHLORIDE 600; 310; 30; 20 MG/100ML; MG/100ML; MG/100ML; MG/100ML
125 INJECTION, SOLUTION INTRAVENOUS CONTINUOUS
Status: CANCELLED | OUTPATIENT
Start: 2024-07-24

## 2024-07-24 RX ORDER — SODIUM CHLORIDE 0.9 % (FLUSH) 0.9 %
10 SYRINGE (ML) INJECTION AS NEEDED
Status: CANCELLED | OUTPATIENT
Start: 2024-07-24

## 2024-07-24 RX ORDER — ASCORBIC ACID 500 MG
1 TABLET ORAL DAILY
COMMUNITY
Start: 2024-05-29

## 2024-07-24 RX ORDER — SODIUM CHLORIDE 9 MG/ML
40 INJECTION, SOLUTION INTRAVENOUS AS NEEDED
Status: CANCELLED | OUTPATIENT
Start: 2024-07-24

## 2024-07-24 RX ORDER — ACETAMINOPHEN 500 MG
1000 TABLET ORAL ONCE
Status: CANCELLED | OUTPATIENT
Start: 2024-07-24 | End: 2024-07-24

## 2024-07-24 RX ORDER — FERROUS SULFATE 325(65) MG
1 TABLET, DELAYED RELEASE (ENTERIC COATED) ORAL DAILY
COMMUNITY
Start: 2024-05-28

## 2024-07-24 NOTE — ASSESSMENT & PLAN NOTE
Pt stopped the OCPs as they weren't helping and she was having side effects  She is interested in surgical management

## 2024-08-12 NOTE — PROGRESS NOTES
CC: Presents for Endometrial biopsy  Procedure was explained in detail.  She understands the potential risks include, but are not limited to, failure (pregnancy), pain, bleeding, uterine perforation, and infection.  Her questions have been answered.      Subjective:  Pt has no complaints    Objective:  /71   Pulse 70   Wt 65.8 kg (145 lb)   LMP 08/07/2024 (Exact Date)   Breastfeeding No   BMI 27.41 kg/m²   General- NAD, alert and oriented, appropriate  Psych- Normal mood, good memory  Abdomen- Soft, non distended, non tender, no masses  Paracervical block performed after patient consent obtained with 1% lidocaine approximately 5 mL bilaterally  External genitalia- Normal, no lesions  Vagina- Normal, no discharge  Uterus- Normal size, shape & consistency.  Non tender, mobile.  Cvx- Normal without lesion or discharge.     Betadine x3.  Tenaculum placed Yes.   Uterus sounded to 8.5cm.    Patient tolerated well.      Assessment and Plan:  Diagnoses and all orders for this visit:    1. Encounter for preprocedural laboratory examination (Primary)  -     POC Pregnancy, Urine    2. Abnormal uterine bleeding (AUB)  Assessment & Plan:  Pt is scheduled for hysteroscopy/D&C/endometrial ablation.  She has failed medical management of AUB.  She is here today for EMB.  EMB was obtained without difficulty    Orders:  -     Tissue Pathology Exam    3. Submucous leiomyoma of uterus  Assessment & Plan:  Scheduled for endometrial ablation            Counseling:  She was counseled on EMB.  PRECAUTIONS-- If she has any fevers >101.5F, and abdominal/pelvic pain, or bleeding > 1pad/2hours, she needs to call our office or on call/ER (after hours/weekend).  Follow Up:  Return for 2 weeks post op.      Yanelis Ceja MD  08/13/2024

## 2024-08-13 ENCOUNTER — PROCEDURE VISIT (OUTPATIENT)
Dept: OBSTETRICS AND GYNECOLOGY | Facility: CLINIC | Age: 39
End: 2024-08-13
Payer: COMMERCIAL

## 2024-08-13 VITALS
SYSTOLIC BLOOD PRESSURE: 102 MMHG | DIASTOLIC BLOOD PRESSURE: 71 MMHG | BODY MASS INDEX: 27.41 KG/M2 | HEART RATE: 70 BPM | WEIGHT: 145 LBS

## 2024-08-13 DIAGNOSIS — N93.9 ABNORMAL UTERINE BLEEDING (AUB): ICD-10-CM

## 2024-08-13 DIAGNOSIS — D25.0 SUBMUCOUS LEIOMYOMA OF UTERUS: ICD-10-CM

## 2024-08-13 DIAGNOSIS — Z01.812 ENCOUNTER FOR PREPROCEDURAL LABORATORY EXAMINATION: Primary | ICD-10-CM

## 2024-08-13 PROCEDURE — 88305 TISSUE EXAM BY PATHOLOGIST: CPT | Performed by: OBSTETRICS & GYNECOLOGY

## 2024-08-14 NOTE — ASSESSMENT & PLAN NOTE
Pt is scheduled for hysteroscopy/D&C/endometrial ablation.  She has failed medical management of AUB.  She is here today for EMB.  EMB was obtained without difficulty

## 2024-09-03 NOTE — PRE-PROCEDURE INSTRUCTIONS
PATIENT INSTRUCTED TO BE:    - NOTHING TO EAT AFTER MIDNIGHT OR CHEW, EXCEPT CAN HAVE CLEAR LIQUIDS 2 HOURS PRIOR TO SURGERY ARRIVAL TIME , NO MORE THAN 8 OZ. (NOTHING RED)     - TO HOLD ALL VITAMINS, SUPPLEMENTS, NSAIDS FOR ONE WEEK PRIOR TO THEIR SURGICAL PROCEDURE ( STOP VITAMIN C)    - DO NOT TAKE ___N/A__ 7 DAYS PRIOR TO PROCEDURE PER ANESTHESIA RECOMMENDATIONS/INSTRUCTIONS     - INSTRUCTED PT TO USE SURGICAL SOAP 1 TIME THE NIGHT PRIOR TO SURGERY __5-2-21_________ OR THE AM OF SURGERY __6-8-91___________   USE THE SOAP FROM NECK TO TOES, AVOID THEIR FACE, HAIR, AND PRIVATE PARTS. IF USE THE SOAP THE NIGHT PRIOR TO SURGERY, CHANGE BED LINENS AND NO PETS IN THE BED.     INSTRUCTED NO LOTIONS, JEWELRY, PIERCINGS,  NAIL POLISH, OR DEODORANT DAY OF SURGERY    - IF DIABETIC, CHECK BLOOD GLUCOSE IF LESS THAN 70 OR HAVING SYMPTOMS CALL THE PREOP AREA FOR INSTRUCTIONS ON AM OF SURGERY ([phone] )    -INSTRUCTED TO TAKE THE FOLLOWING MEDICATIONS THE DAY OF SURGERY WITH SIPS OF WATER:         IRON AM OF SURGERY        - DO NOT BRING ANY MEDICATIONS WITH YOU TO THE HOSPITAL THE DAY OF SURGERY, EXCEPT IF USE INHALERS. BRING INHALERS DAY OF SURGERY       - BRING CPAP OR BIPAP TO THE HOSPITAL ONLY IF YOU ARE SPENDING THE NIGHT    - DO NOT SMOKE OR VAPE 24 HOURS PRIOR TO PROCEDURE PER ANESTHESIA REQUEST     -MAKE SURE YOU HAVE A RIDE HOME OR SOMEONE TO STAY WITH YOU THE DAY OF THE PROCEDURE AFTER YOU GO HOME     - FOLLOW ANY OTHER INSTRUCTIONS GIVEN TO YOU BY YOUR SURGEON'S OFFICE.     - DAY OF SURGERY __9-5-24__, [facility], CHECK IN AT THE DESK FOR REGISTRATION/SURGERY     - YOU WILL RECEIVE A PHONE CALL THE DAY PRIOR TO SURGERY BETWEEN 1PM AND 4 PM WITH ARRIVAL TIME, IF YOUR SURGERY IS ON A MONDAY YOU WILL RECEIVE A CALL THE FRIDAY PRIOR TO SURGERY DATE    - BRING CASH OR CREDIT CARD FOR COPAYMENT OF MEDICATIONS AFTER SURGERY IF YOU USE THE HOSPITAL PHARMACY (MEDS TO BED)    - PREADMISSION TESTING  NURSE PALOMO MENDOZA -[phone] IF HAVE ANY QUESTIONS     -PATIENT PROVIDED THE NUMBER FOR PREOP SURGICAL DEPT IF HAD QUESTIONS AFTER HOURS PRIOR TO SURGERY ([phone]).  INFORMED PT IF NO ANSWER, LEAVE A MESSAGE AND SOMEONE WILL RETURN THEIR CALL       PATIENT VERBALIZED UNDERSTANDING       Clear Liquid Diet        Find out when you need to start a clear liquid diet.   Think of “clear liquids” as anything you could read a newspaper through. This includes things like water, broth, sports drinks, or tea WITHOUT any kind of milk or cream.           Once you are told to start a clear liquid diet, only drink these things until 2 hours before arrival to the hospital or when the hospital says to stop. Total volume limitation: 8 oz.       Clear liquids you CAN drink:   Water   Clear broth: beef, chicken, vegetable, or bone broth with nothing in it   Gatorade   Lemonade or Luis-aid   Soda   Tea, coffee (NO cream or honey)   Jell-O (without fruit)   Popsicles (without fruit or cream)   Italian ices   Juice without pulp: apple, white, grape   You may use salt, pepper, and sugar  NO RED  NO NOODLES    Do NOT drink:   Milk or cream   Soy milk, almond milk, coconut milk, or other non-dairy drinks and   creamers   Milkshakes or smoothies   Tomato juice   Orange juice   Grapefruit juice   Cream soups or any other than broth         Clear Liquid Diet:  Do NOT eat any solid food.  Do NOT eat or suck on mints or candy.  Do NOT chew gum.  Do NOT drink thick liquids like milk or juice with pulp in it.  Do NOT add milk, cream, or anything like soy milk or almond milk to coffee or tea.

## 2024-09-04 ENCOUNTER — ANESTHESIA EVENT (OUTPATIENT)
Dept: PERIOP | Facility: HOSPITAL | Age: 39
End: 2024-09-04
Payer: COMMERCIAL

## 2024-09-04 PROCEDURE — S0260 H&P FOR SURGERY: HCPCS | Performed by: OBSTETRICS & GYNECOLOGY

## 2024-09-04 NOTE — H&P
[facility]   PREOPERATIVE HISTORY AND PHYSICAL    Patient Name:Janneth Carvalho  : 1985  MRN: [id number]  Primary Care Physician: Christian Hernandez MD  Date of admission: (Not on file)    Subjective   Subjective     Chief Complaint: preoperative evaluation    History of Present Illness  Janneth Carvalho is a 39 y.o. female  who presents for preoperative evaluation. She is scheduled for HYSTEROSCOPY WITH ENDOMETRIAL ABLATION NOVASURE (N/A)    Patient Active Problem List   Diagnosis    Submucous leiomyoma of uterus    Abnormal uterine bleeding (AUB)       Review of Systems     Personal History     Past Medical History:   Diagnosis Date    Abnormal uterine bleeding (AUB)     Herpes     cold sores       Past Surgical History:   Procedure Laterality Date     SECTION       SECTION WITH TUBAL      TUBAL ABDOMINAL LIGATION         Obstetric History:  OB History          2    Para   2    Term   1       1    AB        Living   2         SAB        IAB        Ectopic        Molar        Multiple        Live Births   2               Menstrual History:     No LMP recorded. (Menstrual status: Tubal ligation).       # 1 - Date: 12, Sex: Male, Weight: 2041 g (4 lb 8 oz), GA: 34w6d, Type: , Low Transverse, Apgar1: 7, Apgar5: 8, Living: Living, [facility]    # 2 - Date: , Sex: Female, Weight: None, GA: 39w0d, Type: , Unspecified, Apgar1: None, Apgar5: None, Living: Living, Birth Comments: None      Family History: Her family history is not on file.     Social History: She  reports that she has never smoked. She has never used smokeless tobacco. She reports that she does not drink alcohol and does not use drugs.    Home Medications:  ascorbic acid and ferrous sulfate    Allergies:  She has No Known Allergies.    Objective    Objective     Vitals:         Physical Exam  Vitals and nursing note reviewed.    Constitutional:       Appearance: Normal appearance.   Cardiovascular:      Rate and Rhythm: Normal rate and regular rhythm.      Heart sounds: Normal heart sounds.   Pulmonary:      Effort: Pulmonary effort is normal.      Breath sounds: Normal breath sounds.   Abdominal:      General: Abdomen is flat. Bowel sounds are normal.      Palpations: Abdomen is soft.   Neurological:      Mental Status: She is alert.         Assessment & Plan   Assessment / Plan     Brief Patient Summary:  Janneth Carvalho is a 39 y.o. female who presents for preoperative evaluation.    Pre-Op Diagnosis Codes:     * Abnormal uterine bleeding (AUB) [N93.9]    Active Hospital Problems:  Active Hospital Problems    Diagnosis     **Abnormal uterine bleeding (AUB)      Plan:   Procedure(s):  HYSTEROSCOPY WITH ENDOMETRIAL ABLATION NOVASURE    The risks, benefits, and alternatives of the procedure are reviewed with the patient including but not limited to bleeding, infection and damage to internal organs. Pt was counseled at length regarding the risks and benefits of surgery.  The risks include but are not limited to the risk of anesthesia, the risk of bleeding, the risk of infection, the risk of injury to the bowel, bladder, ureters and any surrounding structures.  Risks also include possibility of needing future surgery to correct an issue as a result of the first surgery.  All questions were answered and informed consent was obtained.     Questions and concerns were addressed.     Yanelis Ceja MD

## 2024-09-05 ENCOUNTER — ANESTHESIA (OUTPATIENT)
Dept: PERIOP | Facility: HOSPITAL | Age: 39
End: 2024-09-05
Payer: COMMERCIAL

## 2024-09-05 ENCOUNTER — HOSPITAL ENCOUNTER (OUTPATIENT)
Facility: HOSPITAL | Age: 39
Setting detail: HOSPITAL OUTPATIENT SURGERY
Discharge: HOME | End: 2024-09-05
Attending: OBSTETRICS & GYNECOLOGY | Admitting: OBSTETRICS & GYNECOLOGY
Payer: COMMERCIAL

## 2024-09-05 VITALS
TEMPERATURE: 98.5 F | OXYGEN SATURATION: 99 % | DIASTOLIC BLOOD PRESSURE: 62 MMHG | SYSTOLIC BLOOD PRESSURE: 118 MMHG | HEIGHT: 62 IN | HEART RATE: 62 BPM | RESPIRATION RATE: 20 BRPM | WEIGHT: 142.2 LBS | BODY MASS INDEX: 26.17 KG/M2

## 2024-09-05 DIAGNOSIS — N93.9 ABNORMAL UTERINE BLEEDING (AUB): ICD-10-CM

## 2024-09-05 LAB
B-HCG UR QL: NEGATIVE
BACTERIA UR QL AUTO: ABNORMAL /HPF
BASOPHILS # BLD AUTO: 0.05 10*3/MM3 (ref 0–0.2)
BASOPHILS NFR BLD AUTO: 1.2 % (ref 0–1.5)
BILIRUB UR QL STRIP: NEGATIVE
CLARITY UR: CLEAR
COLOR UR: YELLOW
DEPRECATED RDW RBC AUTO: 49.1 FL (ref 37–54)
EOSINOPHIL # BLD AUTO: 0.1 10*3/MM3 (ref 0–0.4)
EOSINOPHIL NFR BLD AUTO: 2.5 % (ref 0.3–6.2)
ERYTHROCYTE [DISTWIDTH] IN BLOOD BY AUTOMATED COUNT: 18 % (ref 12.3–15.4)
GLUCOSE UR STRIP-MCNC: NEGATIVE MG/DL
HCT VFR BLD AUTO: 35.6 % (ref 34–46.6)
HGB BLD-MCNC: 10.9 G/DL (ref 12–15.9)
HGB UR QL STRIP.AUTO: ABNORMAL
HYALINE CASTS UR QL AUTO: ABNORMAL /LPF
IMM GRANULOCYTES # BLD AUTO: 0 10*3/MM3 (ref 0–0.05)
IMM GRANULOCYTES NFR BLD AUTO: 0 % (ref 0–0.5)
KETONES UR QL STRIP: NEGATIVE
LEUKOCYTE ESTERASE UR QL STRIP.AUTO: NEGATIVE
LYMPHOCYTES # BLD AUTO: 1.43 10*3/MM3 (ref 0.7–3.1)
LYMPHOCYTES NFR BLD AUTO: 35.1 % (ref 19.6–45.3)
MCH RBC QN AUTO: 23.4 PG (ref 26.6–33)
MCHC RBC AUTO-ENTMCNC: 30.6 G/DL (ref 31.5–35.7)
MCV RBC AUTO: 76.4 FL (ref 79–97)
MONOCYTES # BLD AUTO: 0.43 10*3/MM3 (ref 0.1–0.9)
MONOCYTES NFR BLD AUTO: 10.6 % (ref 5–12)
NEUTROPHILS NFR BLD AUTO: 2.06 10*3/MM3 (ref 1.7–7)
NEUTROPHILS NFR BLD AUTO: 50.6 % (ref 42.7–76)
NITRITE UR QL STRIP: NEGATIVE
NRBC BLD AUTO-RTO: 0 /100 WBC (ref 0–0.2)
PH UR STRIP.AUTO: 6 [PH] (ref 5–8)
PLATELET # BLD AUTO: 291 10*3/MM3 (ref 140–450)
PMV BLD AUTO: 9.7 FL (ref 6–12)
PROT UR QL STRIP: NEGATIVE
RBC # BLD AUTO: 4.66 10*6/MM3 (ref 3.77–5.28)
RBC # UR STRIP: ABNORMAL /HPF
REF LAB TEST METHOD: ABNORMAL
SP GR UR STRIP: 1.02 (ref 1–1.03)
SQUAMOUS #/AREA URNS HPF: ABNORMAL /HPF
UROBILINOGEN UR QL STRIP: ABNORMAL
WBC # UR STRIP: ABNORMAL /HPF
WBC NRBC COR # BLD AUTO: 4.07 10*3/MM3 (ref 3.4–10.8)

## 2024-09-05 PROCEDURE — 88305 TISSUE EXAM BY PATHOLOGIST: CPT | Performed by: OBSTETRICS & GYNECOLOGY

## 2024-09-05 PROCEDURE — 25010000002 FENTANYL CITRATE (PF) 50 MCG/ML SOLUTION

## 2024-09-05 PROCEDURE — 25010000002 KETOROLAC TROMETHAMINE PER 15 MG

## 2024-09-05 PROCEDURE — 25010000002 CEFAZOLIN PER 500 MG: Performed by: OBSTETRICS & GYNECOLOGY

## 2024-09-05 PROCEDURE — 25810000003 LACTATED RINGERS PER 1000 ML: Performed by: ANESTHESIOLOGY

## 2024-09-05 PROCEDURE — 25010000002 BUPIVACAINE (PF) 0.5 % SOLUTION: Performed by: OBSTETRICS & GYNECOLOGY

## 2024-09-05 PROCEDURE — 25010000002 PROPOFOL 10 MG/ML EMULSION

## 2024-09-05 PROCEDURE — 25010000002 DEXAMETHASONE PER 1 MG

## 2024-09-05 PROCEDURE — C1782 MORCELLATOR: HCPCS | Performed by: OBSTETRICS & GYNECOLOGY

## 2024-09-05 PROCEDURE — 58563 HYSTEROSCOPY ABLATION: CPT | Performed by: OBSTETRICS & GYNECOLOGY

## 2024-09-05 PROCEDURE — 58561 HYSTEROSCOPY REMOVE MYOMA: CPT | Performed by: OBSTETRICS & GYNECOLOGY

## 2024-09-05 PROCEDURE — 81001 URINALYSIS AUTO W/SCOPE: CPT | Performed by: OBSTETRICS & GYNECOLOGY

## 2024-09-05 PROCEDURE — 85025 COMPLETE CBC W/AUTO DIFF WBC: CPT | Performed by: OBSTETRICS & GYNECOLOGY

## 2024-09-05 PROCEDURE — 25010000002 ONDANSETRON PER 1 MG

## 2024-09-05 PROCEDURE — 25010000002 MIDAZOLAM PER 1MG: Performed by: ANESTHESIOLOGY

## 2024-09-05 PROCEDURE — 81025 URINE PREGNANCY TEST: CPT | Performed by: OBSTETRICS & GYNECOLOGY

## 2024-09-05 RX ORDER — LIDOCAINE HYDROCHLORIDE 20 MG/ML
INJECTION, SOLUTION EPIDURAL; INFILTRATION; INTRACAUDAL; PERINEURAL AS NEEDED
Status: DISCONTINUED | OUTPATIENT
Start: 2024-09-05 | End: 2024-09-05 | Stop reason: SURG

## 2024-09-05 RX ORDER — SODIUM CHLORIDE 0.9 % (FLUSH) 0.9 %
10 SYRINGE (ML) INJECTION AS NEEDED
Status: DISCONTINUED | OUTPATIENT
Start: 2024-09-05 | End: 2024-09-05 | Stop reason: HOSPADM

## 2024-09-05 RX ORDER — BUPIVACAINE HYDROCHLORIDE 5 MG/ML
INJECTION, SOLUTION EPIDURAL; INTRACAUDAL AS NEEDED
Status: DISCONTINUED | OUTPATIENT
Start: 2024-09-05 | End: 2024-09-05 | Stop reason: HOSPADM

## 2024-09-05 RX ORDER — SODIUM CHLORIDE 9 MG/ML
40 INJECTION, SOLUTION INTRAVENOUS AS NEEDED
Status: DISCONTINUED | OUTPATIENT
Start: 2024-09-05 | End: 2024-09-05 | Stop reason: HOSPADM

## 2024-09-05 RX ORDER — MEPERIDINE HYDROCHLORIDE 25 MG/ML
12.5 INJECTION INTRAMUSCULAR; INTRAVENOUS; SUBCUTANEOUS
Status: DISCONTINUED | OUTPATIENT
Start: 2024-09-05 | End: 2024-09-05 | Stop reason: HOSPADM

## 2024-09-05 RX ORDER — FENTANYL CITRATE 50 UG/ML
INJECTION, SOLUTION INTRAMUSCULAR; INTRAVENOUS AS NEEDED
Status: DISCONTINUED | OUTPATIENT
Start: 2024-09-05 | End: 2024-09-05 | Stop reason: SURG

## 2024-09-05 RX ORDER — MAGNESIUM HYDROXIDE 1200 MG/15ML
LIQUID ORAL AS NEEDED
Status: DISCONTINUED | OUTPATIENT
Start: 2024-09-05 | End: 2024-09-05 | Stop reason: HOSPADM

## 2024-09-05 RX ORDER — SODIUM CHLORIDE, SODIUM LACTATE, POTASSIUM CHLORIDE, CALCIUM CHLORIDE 600; 310; 30; 20 MG/100ML; MG/100ML; MG/100ML; MG/100ML
125 INJECTION, SOLUTION INTRAVENOUS CONTINUOUS
Status: DISCONTINUED | OUTPATIENT
Start: 2024-09-05 | End: 2024-09-05 | Stop reason: HOSPADM

## 2024-09-05 RX ORDER — ACETAMINOPHEN 500 MG
1000 TABLET ORAL ONCE
Status: COMPLETED | OUTPATIENT
Start: 2024-09-05 | End: 2024-09-05

## 2024-09-05 RX ORDER — ACETAMINOPHEN 500 MG
1000 TABLET ORAL ONCE
Status: DISCONTINUED | OUTPATIENT
Start: 2024-09-05 | End: 2024-09-05 | Stop reason: SDUPTHER

## 2024-09-05 RX ORDER — OXYCODONE HYDROCHLORIDE 5 MG/1
5 TABLET ORAL
Status: DISCONTINUED | OUTPATIENT
Start: 2024-09-05 | End: 2024-09-05 | Stop reason: HOSPADM

## 2024-09-05 RX ORDER — PROMETHAZINE HYDROCHLORIDE 12.5 MG/1
25 TABLET ORAL ONCE AS NEEDED
Status: DISCONTINUED | OUTPATIENT
Start: 2024-09-05 | End: 2024-09-05 | Stop reason: HOSPADM

## 2024-09-05 RX ORDER — PROMETHAZINE HYDROCHLORIDE 25 MG/1
25 SUPPOSITORY RECTAL ONCE AS NEEDED
Status: DISCONTINUED | OUTPATIENT
Start: 2024-09-05 | End: 2024-09-05 | Stop reason: HOSPADM

## 2024-09-05 RX ORDER — ONDANSETRON 2 MG/ML
4 INJECTION INTRAMUSCULAR; INTRAVENOUS ONCE AS NEEDED
Status: DISCONTINUED | OUTPATIENT
Start: 2024-09-05 | End: 2024-09-05 | Stop reason: SDUPTHER

## 2024-09-05 RX ORDER — SODIUM CHLORIDE 0.9 % (FLUSH) 0.9 %
3 SYRINGE (ML) INJECTION EVERY 12 HOURS SCHEDULED
Status: DISCONTINUED | OUTPATIENT
Start: 2024-09-05 | End: 2024-09-05 | Stop reason: HOSPADM

## 2024-09-05 RX ORDER — IBUPROFEN 600 MG/1
600 TABLET, FILM COATED ORAL EVERY 6 HOURS PRN
Qty: 40 TABLET | Refills: 0 | Status: SHIPPED | OUTPATIENT
Start: 2024-09-05

## 2024-09-05 RX ORDER — ONDANSETRON 2 MG/ML
4 INJECTION INTRAMUSCULAR; INTRAVENOUS ONCE AS NEEDED
Status: DISCONTINUED | OUTPATIENT
Start: 2024-09-05 | End: 2024-09-05 | Stop reason: HOSPADM

## 2024-09-05 RX ORDER — PROPOFOL 10 MG/ML
VIAL (ML) INTRAVENOUS AS NEEDED
Status: DISCONTINUED | OUTPATIENT
Start: 2024-09-05 | End: 2024-09-05 | Stop reason: SURG

## 2024-09-05 RX ORDER — ONDANSETRON 2 MG/ML
INJECTION INTRAMUSCULAR; INTRAVENOUS AS NEEDED
Status: DISCONTINUED | OUTPATIENT
Start: 2024-09-05 | End: 2024-09-05 | Stop reason: SURG

## 2024-09-05 RX ORDER — DEXAMETHASONE SODIUM PHOSPHATE 4 MG/ML
INJECTION, SOLUTION INTRA-ARTICULAR; INTRALESIONAL; INTRAMUSCULAR; INTRAVENOUS; SOFT TISSUE AS NEEDED
Status: DISCONTINUED | OUTPATIENT
Start: 2024-09-05 | End: 2024-09-05 | Stop reason: SURG

## 2024-09-05 RX ORDER — SODIUM CHLORIDE, SODIUM LACTATE, POTASSIUM CHLORIDE, CALCIUM CHLORIDE 600; 310; 30; 20 MG/100ML; MG/100ML; MG/100ML; MG/100ML
9 INJECTION, SOLUTION INTRAVENOUS CONTINUOUS PRN
Status: DISCONTINUED | OUTPATIENT
Start: 2024-09-05 | End: 2024-09-05 | Stop reason: HOSPADM

## 2024-09-05 RX ORDER — MIDAZOLAM HYDROCHLORIDE 2 MG/2ML
2 INJECTION, SOLUTION INTRAMUSCULAR; INTRAVENOUS ONCE
Status: COMPLETED | OUTPATIENT
Start: 2024-09-05 | End: 2024-09-05

## 2024-09-05 RX ORDER — KETOROLAC TROMETHAMINE 30 MG/ML
INJECTION, SOLUTION INTRAMUSCULAR; INTRAVENOUS AS NEEDED
Status: DISCONTINUED | OUTPATIENT
Start: 2024-09-05 | End: 2024-09-05 | Stop reason: SURG

## 2024-09-05 RX ADMIN — KETOROLAC TROMETHAMINE 15 MG: 30 INJECTION, SOLUTION INTRAMUSCULAR; INTRAVENOUS at 11:38

## 2024-09-05 RX ADMIN — DEXAMETHASONE SODIUM PHOSPHATE 8 MG: 4 INJECTION, SOLUTION INTRAMUSCULAR; INTRAVENOUS at 11:06

## 2024-09-05 RX ADMIN — ACETAMINOPHEN 1000 MG: 500 TABLET ORAL at 09:44

## 2024-09-05 RX ADMIN — FENTANYL CITRATE 25 MCG: 50 INJECTION, SOLUTION INTRAMUSCULAR; INTRAVENOUS at 11:14

## 2024-09-05 RX ADMIN — FENTANYL CITRATE 25 MCG: 50 INJECTION, SOLUTION INTRAMUSCULAR; INTRAVENOUS at 10:59

## 2024-09-05 RX ADMIN — PROPOFOL 120 MG: 10 INJECTION, EMULSION INTRAVENOUS at 10:59

## 2024-09-05 RX ADMIN — LIDOCAINE HYDROCHLORIDE 40 MG: 20 INJECTION, SOLUTION INTRAVENOUS at 10:59

## 2024-09-05 RX ADMIN — MIDAZOLAM HYDROCHLORIDE 2 MG: 1 INJECTION, SOLUTION INTRAMUSCULAR; INTRAVENOUS at 10:25

## 2024-09-05 RX ADMIN — ONDANSETRON HYDROCHLORIDE 4 MG: 2 SOLUTION INTRAMUSCULAR; INTRAVENOUS at 11:06

## 2024-09-05 RX ADMIN — SODIUM CHLORIDE 2000 MG: 9 INJECTION, SOLUTION INTRAVENOUS at 11:04

## 2024-09-05 RX ADMIN — SODIUM CHLORIDE, POTASSIUM CHLORIDE, SODIUM LACTATE AND CALCIUM CHLORIDE 9 ML/HR: 600; 310; 30; 20 INJECTION, SOLUTION INTRAVENOUS at 09:38

## 2024-09-05 NOTE — OP NOTE
DILATATION AND CURETTAGE HYSTEROSCOPY NOVASURE ENDOMETRIAL ABLATION  Procedure Report    Patient Name:  Janneth Carvalho  YOB: 1985    Date of Surgery:  9/5/2024         Pre-op Diagnosis:   Abnormal uterine bleeding (AUB) [N93.9]       Post-Op Diagnosis Codes:     * Abnormal uterine bleeding (AUB) [N93.9]     * Fibroid, uterine [D25.9]    Procedure/CPT® Codes:      Procedure(s):  HYSTEROSCOPY WITH ENDOMETRIAL ABLATION NOVASURE. HYSTEROSCOPY MYOMECTOMY    Staff:  Surgeon(s):  Yanelis Ceja MD         Anesthesia: Choice    Estimated Blood Loss: minimal        Specimen:          Specimens       ID Source Type Tests Collected By Collected At Frozen?    A Endometrial Curettings Tissue TISSUE PATHOLOGY EXAM   Yanelis Ceja MD 9/5/24 0954     B Endometrium Curettings TISSUE PATHOLOGY EXAM   Yanelis Ceja MD 9/5/24 1133 No    Description: ENDOMETRIAL FIBROID                Findings: Uterus sounded to 8 cm with a cervical length 4 cm and a cavity length of 4 cm.  Cavity width measured 3.1 cm power was 68 W and total time of endometrial ablation was 1 minute 43 seconds.  There was an intracavitary fibroid on the posterior wall of the uterus in the lower segment.  The fibroid was resected in its entirety with the MyoSure device with a fluid deficit of 75 mL and a total time of 1 minute 17 seconds.  Bilateral tubal ostia were visualized.  There was no evidence of uterine perforation.      Complications: None    Description of Procedure: Pt was taken to the operating room and placed under General anesthesia via LMA. She was placed in low dorsal lithotomy in yellow fin stirrups and prepped and draped in usual sterile fashion. I was called to the room following completion of draping. A surgical time out was performed. A speculum was placed in the vagina and the anterior lip of the cervix was grasped with a single tooth tenaculum. The uterus was gently sounded to a depth of 8 cm.  A paracervical block was  performed with half percent Marcaine with epinephrine approximately 5 mL bilaterally.  The hysteroscope was gently introduced into the endometrial cavity with the above noted findings.  The MyoSure device was used to resect the posterior wall fibroid under direct visualization.  The fibroid was resected in its entirety.  The hysteroscope was withdrawn and multiple passes were made with a sharp curette.  The NovaSure device was placed into the endometrial cavity.  The cavity assessment passed successfully.  The endometrial ablation was performed with the above-noted parameters.  The NovaSure device was withdrawn from the endometrial cavity.  The hysteroscope was gently reintroduced and a homogenous burn was noted throughout the endometrial cavity.  The single-tooth tenaculum was removed from the anterior lip of the cervix.  The speculum was withdrawn.  Excellent hemostasis was observed. All instruments were removed from the vagina. All lap, sponge, and needle counts were correct x 3. The pt tolerated the procedure well and went to the recovery room in stable condition.       Yanelis Ceja MD     Date: 9/5/2024  Time: 11:47 EDT

## 2024-09-05 NOTE — ANESTHESIA PREPROCEDURE EVALUATION
Anesthesia Evaluation     Patient summary reviewed and Nursing notes reviewed   no history of anesthetic complications:   NPO Solid Status: > 8 hours  NPO Liquid Status: > 2 hours           Airway   Mallampati: II  TM distance: >3 FB  Neck ROM: full  No difficulty expected  Dental      Pulmonary - negative pulmonary ROS and normal exam    breath sounds clear to auscultation  Cardiovascular - negative cardio ROS and normal exam  Exercise tolerance: good (4-7 METS)    Rhythm: regular  Rate: normal        Neuro/Psych- negative ROS  GI/Hepatic/Renal/Endo - negative ROS     Musculoskeletal (-) negative ROS    Abdominal    Substance History - negative use     OB/GYN negative ob/gyn ROS         Other   blood dyscrasia anemia,     ROS/Med Hx Other: PAT Nursing Notes unavailable.       Phys Exam Other: 1 chipped upper right premolar            Latest Reference Range & Units 09/05/24 08:15   Hemoglobin 12.0 - 15.9 g/dL 10.9 (L)   Hematocrit 34.0 - 46.6 % 35.6   (L): Data is abnormally low         Anesthesia Plan    ASA 2     general     (Patient understands anesthesia not responsible for dental damage.)  intravenous induction     Anesthetic plan, risks, benefits, and alternatives have been provided, discussed and informed consent has been obtained with: patient.    Use of blood products discussed with patient .    Plan discussed with CRNA.        CODE STATUS:

## 2024-09-05 NOTE — DISCHARGE INSTRUCTIONS
DISCHARGE INSTRUCTIONS  GYNECOLOGICAL  PROCEDURES      For your surgery you had:  General anesthesia (you may have a sore throat for the first 24 hours)     You may experience dizziness, drowsiness, or lightheadedness for several hours following surgery.  Do not stay alone today or tonight.  Limit your activity for 24 hours.  Resume your diet slowly.  Follow any special dietary instructions you may have been given by your doctor.  You should not drive or operate machinery, drink alcohol, or sign legally binding documents for 24 hours or while you are taking pain medication.    NOTIFY YOUR DOCTOR IF YOU EXPERIENCE ANY OF THE FOLLOWING:  Temperature greater than 101 degrees Fahrenheit  Shaking Chills  Redness or excessive drainage from incision  Nausea, vomiting and/or pain that is not controlled by prescribed medications  Increase in bleeding or bleeding that is excessive  Unable to urinate in 6 hours after surgery  If unable to reach your doctor, please go to the closest Emergency Room [] Remove dressing:      [] Skin adhesive will flake off in 10-14 days.    [x] Nothing in the vagina for 2 weeks to include intercourse, douches, or tampons.  [] You may resume intercourse and the use of tampons as your physician has instructed you.      Vaginal bleeding may be expected for several days with flow decreasing with time and never any heavier than a normal  period.  If you have an ablation, vaginal discharge is expected after bleeding stops.   If you have foul smelling discharge, notify your physician.  Medications per physician instructions as indicated on After Visit Summary.    Last dose of pain medication was given at:   Tylenol 1000 mg at 9:44 am .    SPECIAL INSTRUCTIONS:

## 2024-09-05 NOTE — ANESTHESIA POSTPROCEDURE EVALUATION
Patient: Janneth Carvalho    Procedure Summary       Date: 09/05/24 Room / Location: [facility] OR 05 / [facility] MAIN OR    Anesthesia Start: 1054 Anesthesia Stop: 1147    Procedure: HYSTEROSCOPY WITH ENDOMETRIAL ABLATION NOVASURE. HYSTEROSCOPY MYOMECTOMY (Vagina) Diagnosis:       Abnormal uterine bleeding (AUB)      Fibroid, uterine      (Abnormal uterine bleeding (AUB) [N93.9])    Surgeons: Yanelis Ceja MD Provider: Bradley Ahuja MD    Anesthesia Type: general ASA Status: 2            Anesthesia Type: general    Vitals  Vitals Value Taken Time   /81 09/05/24 1201   Temp 36.3 °C (97.4 °F) 09/05/24 1146   Pulse 63 09/05/24 1203   Resp 16 09/05/24 1201   SpO2 100 % 09/05/24 1203   Vitals shown include unfiled device data.        Post Anesthesia Care and Evaluation    Patient location during evaluation: bedside  Patient participation: complete - patient participated  Level of consciousness: awake  Pain score: 2  Pain management: adequate    Airway patency: patent  Anesthetic complications: No anesthetic complications  PONV Status: none  Cardiovascular status: acceptable and stable  Respiratory status: acceptable  Hydration status: acceptable

## 2024-09-24 ENCOUNTER — OFFICE VISIT (OUTPATIENT)
Dept: OBSTETRICS AND GYNECOLOGY | Facility: CLINIC | Age: 39
End: 2024-09-24
Payer: COMMERCIAL

## 2024-09-24 VITALS — BODY MASS INDEX: 26.52 KG/M2 | DIASTOLIC BLOOD PRESSURE: 64 MMHG | SYSTOLIC BLOOD PRESSURE: 104 MMHG | WEIGHT: 145 LBS

## 2024-09-24 DIAGNOSIS — D25.0 SUBMUCOUS LEIOMYOMA OF UTERUS: ICD-10-CM

## 2024-09-24 DIAGNOSIS — N93.9 ABNORMAL UTERINE BLEEDING (AUB): Primary | ICD-10-CM

## 2024-09-24 PROCEDURE — 99024 POSTOP FOLLOW-UP VISIT: CPT | Performed by: OBSTETRICS & GYNECOLOGY

## 2024-09-24 PROCEDURE — 1160F RVW MEDS BY RX/DR IN RCRD: CPT | Performed by: OBSTETRICS & GYNECOLOGY

## 2024-09-24 PROCEDURE — 1159F MED LIST DOCD IN RCRD: CPT | Performed by: OBSTETRICS & GYNECOLOGY

## 2024-09-24 RX ORDER — DEXTROMETHORPHAN HYDROBROMIDE AND PROMETHAZINE HYDROCHLORIDE 15; 6.25 MG/5ML; MG/5ML
SYRUP ORAL
COMMUNITY

## 2024-09-24 RX ORDER — AZITHROMYCIN 250 MG/1
TABLET, FILM COATED ORAL
COMMUNITY

## 2025-03-04 NOTE — PROGRESS NOTES
Well Woman Visit    CC: Scheduled annual well gyn visit  Chief Complaint   Patient presents with    Annual Exam         Contraception:  Tubal ligation, s/p ablation 24    Last Completed Pap Smear            PAP SMEAR (Every 3 Years) Next due on 2024  IgP, Aptima HPV                   Last Completed Mammogram       This patient has no relevant Health Maintenance data.             HPI:   39 y.o.     History of Present Illness  The patient presents for an annual exam.    She underwent an endometrial ablation 2024, following which she had a follow-up visit. Since then, she has not experienced any significant health issues. She reports no instances of bleeding but notes occasional spotting characterized by a brownish discoloration. She expresses no concern regarding this symptom.       PCP: does manage PMHx and preventative labs  History: PMHx, Meds, Allergies, PSHx, Social Hx, and POBHx all reviewed and updated.      PHYSICAL EXAM:  BP 95/62   Pulse 71   Wt 67.1 kg (148 lb)   LMP  (LMP Unknown) Comment: no period with ablation  BMI 27.07 kg/m²  Not found.  General- NAD, alert and oriented, appropriate  Psych- Normal mood, good memory  Neck- No masses, no thyroid enlargement  CV- Regular rhythm, no murnurs  Resp- CTA to bases, no wheezes  Abdomen- Soft, non distended, non tender, no masses  Breast left-  Bilaterally symmetrical, no masses, non tender, no nipple discharge  Breast right- Bilaterally symmetrical, no masses, non tender, no nipple discharge  External genitalia- Normal female, no lesions  Urethra/meatus- Normal, no masses, non tender  Bladder- Normal, no masses, non tender  Vagina- Normal, no atrophy, no lesions, no discharge.    Cvx- Normal, no lesions, no discharge, No cervical motion tenderness  Uterus- Normal size, shape & consistency.  Non tender, mobile.  Adnexa- No mass, non tender  Anus/Rectum/Perineum- Not performed  Lymphatic- No palpable neck, axillary, or  groin nodes  Ext- No edema, no cyanosis    Skin- No lesions, no rashes, no acanthosis nigricans      ASSESSMENT and PLAN:    Diagnoses and all orders for this visit:    1. Well woman exam with routine gynecological exam (Primary)    2. Encounter for screening mammogram for malignant neoplasm of breast  -     Mammo Screening Digital Tomosynthesis Bilateral With CAD; Future        Assessment & Plan  1. Annual examination.  Her Pap smear is current and up to date. A pelvic examination will be conducted during this visit, but a Pap smear is not required at this time. A breast examination will also be performed, and mammogram screenings will be initiated.    PROCEDURE  The patient underwent a surgical procedure in 09/2024.       Preventative:  BREAST HEALTH- Monthly self breast exam importance and how to reviewed. MMG and/or MRI (prn) reviewed per society guidelines and her individual history. Screen: Pt will call to schedule  CERVICAL CANCER Screening- Reviewed current ASCCP guidelines for screening w and wo cotest HPV, age specific.  Screen: Already up to date  Follow up PCP/Specialist PMHx and/or Labs      She understands the importance of having any ordered tests to be performed in a timely fashion.  The risks of not performing them include, but are not limited to, advanced cancer stages, bone loss from osteoporosis and/or subsequent increase in morbidity and/or mortality.  She is encouraged to review her results online and/or contact or office if she has questions.     Follow Up:  Return in about 1 year (around 3/5/2026) for Annual physical.            Yanelis Ceja MD  03/05/2025    [facility]  [facility] [place] [address]  Dept: [phone]  Dept Fax: [phone]  Loc: [phone]  Loc Fax: [phone]     Patient or patient representative verbalized consent for the use of Ambient Listening during the visit with  Yanelis Ceja MD for chart  documentation. 3/6/2025  12:20 EST

## 2025-03-05 ENCOUNTER — OFFICE VISIT (OUTPATIENT)
Dept: OBSTETRICS AND GYNECOLOGY | Facility: CLINIC | Age: 40
End: 2025-03-05
Payer: COMMERCIAL

## 2025-03-05 VITALS
DIASTOLIC BLOOD PRESSURE: 62 MMHG | SYSTOLIC BLOOD PRESSURE: 95 MMHG | HEART RATE: 71 BPM | WEIGHT: 148 LBS | BODY MASS INDEX: 27.07 KG/M2

## 2025-03-05 DIAGNOSIS — Z12.31 ENCOUNTER FOR SCREENING MAMMOGRAM FOR MALIGNANT NEOPLASM OF BREAST: ICD-10-CM

## 2025-03-05 DIAGNOSIS — Z01.419 WELL WOMAN EXAM WITH ROUTINE GYNECOLOGICAL EXAM: Primary | ICD-10-CM

## (undated) DEVICE — SLV SCD KN/LEN ADJ EXPRSS BLENDED MD 1P/U

## (undated) DEVICE — LITHOTOMY-YELLOW FINS: Brand: MEDLINE INDUSTRIES, INC.

## (undated) DEVICE — KT PROC HYSTSCPY TB ST

## (undated) DEVICE — 1000ML,PRESSURE INFUSER W/STOPCOCK: Brand: MEDLINE

## (undated) DEVICE — SOL NACL 0.9PCT 1000ML

## (undated) DEVICE — GLV SURG SENSICARE PI ORTHO SZ6.5 LF STRL

## (undated) DEVICE — PREP TRAY WITH CHG: Brand: MEDLINE INDUSTRIES, INC.

## (undated) DEVICE — SHEET,DRAPE,70X85,STERILE: Brand: MEDLINE

## (undated) DEVICE — DEV TISS REMOV MYOSURE REACH

## (undated) DEVICE — TOWEL,OR,DSP,ST,BLUE,STD,4/PK,20PK/CS: Brand: MEDLINE

## (undated) DEVICE — PROB ABL ENDOMTRL NOVASURE/G4 W/SURESND